# Patient Record
Sex: MALE | Race: WHITE | NOT HISPANIC OR LATINO | URBAN - METROPOLITAN AREA
[De-identification: names, ages, dates, MRNs, and addresses within clinical notes are randomized per-mention and may not be internally consistent; named-entity substitution may affect disease eponyms.]

---

## 2019-02-07 ENCOUNTER — IMPORTED ENCOUNTER (OUTPATIENT)
Dept: URBAN - METROPOLITAN AREA CLINIC 50 | Facility: CLINIC | Age: 64
End: 2019-02-07

## 2019-02-14 ENCOUNTER — IMPORTED ENCOUNTER (OUTPATIENT)
Dept: URBAN - METROPOLITAN AREA CLINIC 50 | Facility: CLINIC | Age: 64
End: 2019-02-14

## 2019-02-15 ENCOUNTER — IMPORTED ENCOUNTER (OUTPATIENT)
Dept: URBAN - METROPOLITAN AREA CLINIC 50 | Facility: CLINIC | Age: 64
End: 2019-02-15

## 2019-02-19 ENCOUNTER — IMPORTED ENCOUNTER (OUTPATIENT)
Dept: URBAN - METROPOLITAN AREA CLINIC 50 | Facility: CLINIC | Age: 64
End: 2019-02-19

## 2019-02-19 NOTE — PATIENT DISCUSSION
"""S/P IOL OS: Tecnis [id number] +19.5 @ 175Âº (Target: [place]) +Femto/Arcs +TM/Omidria. Continue post operative instructions and drops per schedule.  """

## 2019-03-01 ENCOUNTER — IMPORTED ENCOUNTER (OUTPATIENT)
Dept: URBAN - METROPOLITAN AREA CLINIC 50 | Facility: CLINIC | Age: 64
End: 2019-03-01

## 2019-03-01 NOTE — PATIENT DISCUSSION
"""[place] with IOL OD: 03/05/2019 Radha [id number] +19.5 Target: [facility]

## 2019-03-05 ENCOUNTER — IMPORTED ENCOUNTER (OUTPATIENT)
Dept: URBAN - METROPOLITAN AREA CLINIC 50 | Facility: CLINIC | Age: 64
End: 2019-03-05

## 2019-03-05 NOTE — PATIENT DISCUSSION
"""S/P IOL OD: Tecnis [id number] +19.5 (Target: [place]) +Femto/Arcs +TM/Omidria.  Continue post operative instructions and drops per schedule. "" ""Increase Pred-Gati-Brom right eye twice a day

## 2019-03-15 ENCOUNTER — IMPORTED ENCOUNTER (OUTPATIENT)
Dept: URBAN - METROPOLITAN AREA CLINIC 50 | Facility: CLINIC | Age: 64
End: 2019-03-15

## 2019-04-05 ENCOUNTER — IMPORTED ENCOUNTER (OUTPATIENT)
Dept: URBAN - METROPOLITAN AREA CLINIC 50 | Facility: CLINIC | Age: 64
End: 2019-04-05

## 2019-04-05 NOTE — PATIENT DISCUSSION
"""S/P IOL OU: OD: Tecnis [id number] +19.5 (Target: [place])Femto/Arcs +TMOmidria. OS: Tecnis [id number] +19.5 @ 175Âº (Target: [place])/Arcs +TM. "

## 2019-06-14 ENCOUNTER — IMPORTED ENCOUNTER (OUTPATIENT)
Dept: URBAN - METROPOLITAN AREA CLINIC 50 | Facility: CLINIC | Age: 64
End: 2019-06-14

## 2021-04-17 ASSESSMENT — TONOMETRY
OD_IOP_MMHG: 19
OS_IOP_MMHG: 14
OD_IOP_MMHG: 14
OS_IOP_MMHG: 13
OD_IOP_MMHG: 13
OS_IOP_MMHG: 18
OD_IOP_MMHG: 15
OS_IOP_MMHG: 13

## 2021-04-17 ASSESSMENT — VISUAL ACUITY
OS_CC: 20/50
OD_OTHER: 20/20. 20/30.
OS_BAT: 20/70
OS_SC: 20/20
OD_SC: 20/20
OD_BAT: 20/25
OS_OTHER: 20/70. 20/70.
OS_SC: 20/20
OD_SC: 20/20
OD_BAT: 20/25
OS_CC: J1+
OD_BAT: 20/25
OS_OTHER: 20/25. 20/40.
OS_CC: 20/50
OS_SC: 20/20
OD_CC: 20/25
OS_BAT: 20/70
OD_OTHER: 20/25. 20/30.
OD_OTHER: 20/25. 20/30.
OS_BAT: 20/70
OD_SC: 20/20
OD_SC: 20/20
OD_CC: J1+
OD_OTHER: 20/25. 20/30.
OS_SC: 20/20
OS_OTHER: 20/70. 20/70.
OS_SC: 20/20-1
OS_BAT: 20/25
OS_OTHER: 20/70. >20/400.
OD_SC: 20/30
OD_SC: 20/50
OD_BAT: 20/20
OS_CC: 20/50
OD_CC: 20/25-1